# Patient Record
Sex: MALE | Race: WHITE | NOT HISPANIC OR LATINO | Employment: STUDENT | URBAN - METROPOLITAN AREA
[De-identification: names, ages, dates, MRNs, and addresses within clinical notes are randomized per-mention and may not be internally consistent; named-entity substitution may affect disease eponyms.]

---
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
## 2021-04-08 DIAGNOSIS — Z11.59 NEED FOR HEPATITIS C SCREENING TEST: ICD-10-CM

## 2023-11-20 NOTE — TELEPHONE ENCOUNTER
----- Message from Mynor GLASER Max sent at 1/27/2020 11:29 AM CST -----  Contact: Mom/Brook  Type:  RX Refill Request    Who Called:  Mom/Brook  Refill or New Rx:  refill  RX Name and Strength:  methylphenidate HCl (CONCERTA) 54 MG CR tablet  How is the patient currently taking it? (ex. 1XDay):  Take 1 tablet (54 mg total) by mouth every morning. - Oral  Is this a 30 day or 90 day RX:  30 tablet 0 12/3/2019   Preferred Pharmacy with phone number:    [id number] - [place] [address] [address]  [address] [address]  Phone: [phone] Fax: [phone]  Ordering Provider:  Ethan Contreras Call Back Number:  [phone]  Additional Information:  n/a     PROVIDER:[TOKEN:[[id number]],SCHEDULEDAPPT:[12/05/2023],SCHEDULEDAPPTTIME:[01:45 PM]]